# Patient Record
Sex: MALE | Race: WHITE
[De-identification: names, ages, dates, MRNs, and addresses within clinical notes are randomized per-mention and may not be internally consistent; named-entity substitution may affect disease eponyms.]

---

## 2022-01-21 ENCOUNTER — HOSPITAL ENCOUNTER (OUTPATIENT)
Dept: HOSPITAL 79 - LAB | Age: 37
End: 2022-01-21
Payer: COMMERCIAL

## 2022-01-21 DIAGNOSIS — R35.0: Primary | ICD-10-CM

## 2022-01-21 DIAGNOSIS — R53.83: ICD-10-CM

## 2022-01-21 DIAGNOSIS — Z13.220: ICD-10-CM

## 2022-01-21 LAB
BUN/CREATININE RATIO: 10 (ref 0–10)
HGB BLD-MCNC: 14.2 GM/DL (ref 14–17.5)
RED BLOOD COUNT: 4.68 M/UL (ref 4.2–5.5)
WHITE BLOOD COUNT: 8.5 K/UL (ref 4.5–11)

## 2022-06-10 ENCOUNTER — HOSPITAL ENCOUNTER (OUTPATIENT)
Dept: GENERAL RADIOLOGY | Facility: HOSPITAL | Age: 37
Discharge: HOME | End: 2022-06-10

## 2022-06-10 ENCOUNTER — TRANSCRIBE ORDERS (OUTPATIENT)
Dept: LAB | Facility: HOSPITAL | Age: 37
End: 2022-06-10

## 2022-06-10 DIAGNOSIS — N20.0 KIDNEY STONE: ICD-10-CM

## 2022-06-10 DIAGNOSIS — N20.0 KIDNEY STONE: Primary | ICD-10-CM

## 2022-06-10 PROCEDURE — 74018 RADEX ABDOMEN 1 VIEW: CPT | Performed by: RADIOLOGY

## 2022-06-10 PROCEDURE — 74018 RADEX ABDOMEN 1 VIEW: CPT

## 2022-07-29 ENCOUNTER — HOSPITAL ENCOUNTER (OUTPATIENT)
Dept: HOSPITAL 79 - EXRD | Age: 37
End: 2022-07-29
Attending: UROLOGY
Payer: COMMERCIAL

## 2022-07-29 DIAGNOSIS — N43.3: ICD-10-CM

## 2022-07-29 DIAGNOSIS — N50.819: Primary | ICD-10-CM

## 2025-01-28 ENCOUNTER — TELEPHONE (OUTPATIENT)
Dept: UROLOGY | Facility: CLINIC | Age: 40
End: 2025-01-28

## 2025-01-28 DIAGNOSIS — N20.0 KIDNEY STONE: Primary | ICD-10-CM

## 2025-01-28 NOTE — TELEPHONE ENCOUNTER
Caller: RASHIDA CHAPPELL    Relationship: SELF    Best call back number:[phone]     What is the best time to reach you: ANYTIME    Who are you requesting to speak with (clinical staff, provider,  specific staff member): PT SAYS HE MISSED A CALL. NO TELEPHONE ENCOUNTERS IN THE PT'S CHART.    Do you know the name of the person who called: NO.    What was the call regarding: N/A    Is it okay if the provider responds through MyChart: N/A

## 2025-04-02 ENCOUNTER — HOSPITAL ENCOUNTER (OUTPATIENT)
Dept: GENERAL RADIOLOGY | Facility: HOSPITAL | Age: 40
Discharge: HOME | End: 2025-04-02
Payer: COMMERCIAL

## 2025-04-02 ENCOUNTER — OFFICE VISIT (OUTPATIENT)
Dept: UROLOGY | Facility: CLINIC | Age: 40
End: 2025-04-02
Payer: COMMERCIAL

## 2025-04-02 VITALS
WEIGHT: 185.8 LBS | BODY MASS INDEX: 29.16 KG/M2 | DIASTOLIC BLOOD PRESSURE: 79 MMHG | HEART RATE: 84 BPM | HEIGHT: 67 IN | SYSTOLIC BLOOD PRESSURE: 111 MMHG

## 2025-04-02 DIAGNOSIS — N20.0 NEPHROLITHIASIS: ICD-10-CM

## 2025-04-02 DIAGNOSIS — N20.0 KIDNEY STONE: ICD-10-CM

## 2025-04-02 DIAGNOSIS — R35.0 FREQUENCY OF URINATION: Primary | ICD-10-CM

## 2025-04-02 LAB
BILIRUB BLD-MCNC: NEGATIVE MG/DL
CLARITY, POC: CLEAR
COLOR UR: YELLOW
EXPIRATION DATE: NORMAL
GLUCOSE UR STRIP-MCNC: NEGATIVE MG/DL
KETONES UR QL: NEGATIVE
LEUKOCYTE EST, POC: NEGATIVE
Lab: NORMAL
NITRITE UR-MCNC: NEGATIVE MG/ML
PH UR: 6 [PH] (ref 5–8)
PROT UR STRIP-MCNC: NEGATIVE MG/DL
RBC # UR STRIP: NEGATIVE /UL
SP GR UR: 1.01 (ref 1–1.03)
UROBILINOGEN UR QL: NORMAL

## 2025-04-02 PROCEDURE — 74018 RADEX ABDOMEN 1 VIEW: CPT

## 2025-04-02 NOTE — PROGRESS NOTES
"Chief Complaint:    Chief Complaint   Patient presents with    Nephrolithiasis     New patients       Blood in Urine       Vital Signs:   /79 (BP Location: Right arm, Patient Position: Sitting, Cuff Size: Large Adult)   Pulse 84   Ht 170.2 cm (67\")   Wt 84.3 kg (185 lb 12.8 oz)   BMI 29.10 kg/m²   Body mass index is 29.1 kg/m².      HPI:  Roni Rand is a 40 y.o. male who presents today for initial evaluation     History of Present Illness  Mr. Rand to the clinic for evaluation of nephrolithiasis.  Patient has a past medical history of kidney stones reports he has passed several voluntarily.  He recently seen in the emergency department in January 2024 at [facility] in [facility] and was evaluated for left-sided back and flank pain.  He had a CT scan of the abdomen pelvis completed at that time that showed 2 distal 3 mm left UVJ calculi causing mild obstructive uropathy.  His urine analysis was unremarkable other than significant amount of blood.  CMP was stable other than a slightly elevated creatinine 1.3.  Previous CT scan in September 2024 showed a proximal 2 mm left ureteral calculus.  He has passed all of the stones.  He has never had surgical intervention.  Patient states he was drinking sweet tea daily but is since discontinued this.  He denies any current symptoms other than some mild back pain at this time.  Urine analysis in office today was completely unremarkable and negative for any microscopic/gross blood.  X-ray completed prior to office visit showed moderate stool burden with no obvious calcifications.  There was some pelvic phleboliths.      Past Medical History:  Past Medical History:   Diagnosis Date    Asthma     Kidney stones        Current Meds:  No current outpatient medications on file.     No current facility-administered medications for this visit.        Allergies:   No Known Allergies     Past Surgical History:  Past Surgical History:   Procedure " Laterality Date    HAND SURGERY      THUMB SURGERY       Social History:  Social History     Socioeconomic History    Marital status: Single   Tobacco Use    Smoking status: Never    Smokeless tobacco: Never   Vaping Use    Vaping status: Never Used   Substance and Sexual Activity    Alcohol use: Never    Drug use: Never    Sexual activity: Defer       Family History:  Family History   Problem Relation Age of Onset    Hypertension Father     Heart disease Father     No Known Problems Mother        Review of Systems:  Review of Systems   Constitutional:  Negative for chills, fatigue, fever and unexpected weight change.   HENT:  Negative for congestion and sinus pressure.    Eyes:  Negative for pain.   Respiratory:  Negative for chest tightness and shortness of breath.    Cardiovascular:  Negative for chest pain.   Gastrointestinal:  Negative for abdominal pain, constipation, diarrhea, nausea and vomiting.   Genitourinary:  Positive for dysuria, frequency and hematuria. Negative for difficulty urinating, flank pain and urgency.   Musculoskeletal:  Negative for back pain and neck pain.   Skin:  Negative for rash.   Neurological:  Negative for dizziness and headaches.   Hematological:  Does not bruise/bleed easily.   Psychiatric/Behavioral:  Negative for confusion and suicidal ideas. The patient is nervous/anxious.        Physical Exam:  Physical Exam  Constitutional:       General: He is not in acute distress.     Appearance: Normal appearance.   HENT:      Head: Normocephalic and atraumatic.      Nose: Nose normal.      Mouth/Throat:      Mouth: Mucous membranes are moist.   Eyes:      Conjunctiva/sclera: Conjunctivae normal.   Cardiovascular:      Rate and Rhythm: Normal rate.      Pulses: Normal pulses.   Pulmonary:      Effort: Pulmonary effort is normal.   Abdominal:      Palpations: Abdomen is soft.   Musculoskeletal:         General: Normal range of motion.      Cervical back: Normal range of motion.   Skin:      General: Skin is warm.   Neurological:      General: No focal deficit present.      Mental Status: He is alert and oriented to person, place, and time.   Psychiatric:         Mood and Affect: Mood normal.         Behavior: Behavior normal.         Thought Content: Thought content normal.         Judgment: Judgment normal.               Recent Image (CT and/or KUB):   CT Abdomen and Pelvis: No results found for this or any previous visit.     CT Stone Protocol: No results found for this or any previous visit.     KUB: Results for orders placed during the hospital encounter of 06/10/22    XR Abdomen KUB    Narrative  EXAM:  XR Abdomen, 1 View    EXAM DATE:  6/10/2022 1:11 PM    CLINICAL HISTORY:  RULE OUT KIDNEY STONE; N20.0-Calculus of kidney    TECHNIQUE:  Frontal supine view of the abdomen/pelvis.    COMPARISON:  No relevant prior studies available.    FINDINGS:  GASTROINTESTINAL TRACT:  Unremarkable.  No dilation.  BONES/JOINTS:  Unremarkable.  VASCULATURE:  Multiple calcifications in the pelvis probably represent  vascular phleboliths.    Impression  Multiple calcifications in the pelvis probably represent vascular  phleboliths.    This report was finalized on 6/10/2022 1:14 PM by Dr. Shane Christianson MD.       Labs:  Brief Urine Lab Results  (Last result in the past 365 days)        Color   Clarity   Blood   Leuk Est   Nitrite   Protein   CREAT   Urine HCG        04/02/25 1505 Yellow   Clear   Negative   Negative   Negative   Negative                 Office Visit on 04/02/2025   Component Date Value Ref Range Status    Color 04/02/2025 Yellow  Yellow, Straw, Dark Yellow, Isadora Final    Clarity, UA 04/02/2025 Clear  Clear Final    Specific Gravity  04/02/2025 1.015  1.005 - 1.030 Final    pH, Urine 04/02/2025 6.0  5.0 - 8.0 Final    Leukocytes 04/02/2025 Negative  Negative Final    Nitrite, UA 04/02/2025 Negative  Negative Final    Protein, POC 04/02/2025 Negative  Negative mg/dL Final    Glucose, UA 04/02/2025  Negative  Negative mg/dL Final    Ketones, UA 04/02/2025 Negative  Negative Final    Urobilinogen, UA 04/02/2025 Normal  Normal, 0.2 E.U./dL Final    Bilirubin 04/02/2025 Negative  Negative Final    Blood, UA 04/02/2025 Negative  Negative Final    Lot Number 04/02/2025 n   Final    Expiration Date 04/02/2025 n   Final        Procedure: None  Procedures     Assessment/Plan:   Problem List Items Addressed This Visit       Nephrolithiasis     Other Visit Diagnoses         Frequency of urination    -  Primary    Relevant Orders    POC Urinalysis Dipstick, Automated (Completed)            Health Maintenance:   Health Maintenance Due   Topic Date Due    COVID-19 Vaccine (1 - 2024-25 season) Never done    HEPATITIS C SCREENING  Never done    ANNUAL PHYSICAL  Never done        Smoking Counseling: Never smoked.  Never used smokeless tobacco    Urine Incontinence: Patient reports that he is not currently experiencing any symptoms of urinary incontinence.    Patient was given instructions and counseling regarding his condition or for health maintenance advice. Please see specific information pulled into the AVS if appropriate.    Patient Education:   Renal calculus - It was discussed with the patient the negative x-ray completed prior to office visit today. We discussed the various therapeutic options available including percutaneous nephrostolithotomy, ureteroscopy and extracorporeal shockwave  lithotripsy.  We discussed the risks of lithotripsy including the passage of stones leading to a 3% chance of Steinstrasse or a large string of stones in the distal ureter. In this incidence the patient was informed that a ureteroscopy is indicated for obstructing fragments.  Patient was informed of an extremely rare incidence of renal hematoma and the significance of this.  Patient was educated on percutaneous nephrostolithotomy and its use as well as the risks and benefits such as the need for postoperative hospitalization, and the  risk of damage to the kidney and the remote risk of a nephrectomy.  We also discussed the use of ureteroscopy in the upper tracts and its decreased success rate to completely remove the stones likely causing stent placement leading to an additional procedure for removal.  We discussed the absolute relative indicators for intervention including the presence of sepsis and uncontrollable pain leading to need for urgent intervention.  We discussed placement of a stent if indicated and the management of the stent as well.  Given patient is asymptomatic x-ray and urine are both negative recommend potential for with observation.  Did discuss the use of Flomax 0.4 mg as needed to help with passage of kidney stones.  Advised to increase water intake 2 to 3 L/day and eliminate any dietary factors contributing to stone formation.  Otherwise we will see the patient back in 6 months or sooner if needed.    Visit Diagnoses:    ICD-10-CM ICD-9-CM   1. Frequency of urination  R35.0 788.41   2. Nephrolithiasis  N20.0 592.0     A total of 30 minutes were spent coordinating this patient’s care in clinic today; 20 minutes of which were face-to-face with the patient, reviewing medical history and counseling on the current treatment and followup plan.  All questions were answered to patient's satisfaction.    Meds Ordered During Visit:  No orders of the defined types were placed in this encounter.      Follow Up Appointment: 6-month  No follow-ups on file.      This document has been electronically signed by Abad Oneal PA-C   April 2, 2025 15:47 EDT    Part of this note may be an electronic transcription/translation of spoken language to printed text using the Dragon Dictation System.

## 2025-04-03 ENCOUNTER — PATIENT ROUNDING (BHMG ONLY) (OUTPATIENT)
Dept: UROLOGY | Facility: CLINIC | Age: 40
End: 2025-04-03
Payer: COMMERCIAL